# Patient Record
(demographics unavailable — no encounter records)

---

## 2024-12-02 NOTE — HISTORY OF PRESENT ILLNESS
[FreeTextEntry1] : spot on the chest and forehead [de-identified] : 64F with no personal hx of skin cancer here for  1) red spot on the chest 2) red spot on the forehead

## 2024-12-02 NOTE — PHYSICAL EXAM
[Alert] : alert [Oriented x 3] : ~L oriented x 3 [Well Nourished] : well nourished [Conjunctiva Non-injected] : conjunctiva non-injected [No Visual Lymphadenopathy] : no visual  lymphadenopathy [No Clubbing] : no clubbing [No Edema] : no edema [No Bromhidrosis] : no bromhidrosis [No Chromhidrosis] : no chromhidrosis [FreeTextEntry3] : The patient is well-appearing, in no acute distress, alert and oriented x 3. Mood and affect are normal. A complete cutaneous examination of the scalp, face, neck, chest, abdomen, back, bilateral arms, bilateral legs, buttocks, digits, nails, eyelids, conjunctiva and lips reveals the following significant findings: -R chest with red vascular papule -R forehead with yellow pink somewhat pearly papule

## 2024-12-19 NOTE — ASSESSMENT
[FreeTextEntry1] : 63 year old female with past medical history of Osteoporosis, Hyperthyroidism s/p LEONARDO (late 90s)on Levothyroxine, Sjorgen's syndrome, Autoimmune Hepatitis presents for follow up  1. Osteoporosis Patient has multiple risk factors of osteoporosis/osteopenia: postmenopausal status, hx of hyperthyroidism, recent anorexia/malnutrition, multiple inflammatory conditions.  Counseled on the need for Calcium either through nutrition or supplement of at least 5973-0053 mg daily Cont Vit D Counseled on importance on strength training exercise for bone health Cont Risedronate 150 mg Qweekly  2. Hypothyroidism TSH stable Cont Levothyroxine 88 mcg QD

## 2024-12-19 NOTE — ASSESSMENT
[FreeTextEntry1] : 63 year old female with past medical history of Osteoporosis, Hyperthyroidism s/p LEONARDO (late 90s)on Levothyroxine, Sjorgen's syndrome, Autoimmune Hepatitis presents for follow up  1. Osteoporosis Patient has multiple risk factors of osteoporosis/osteopenia: postmenopausal status, hx of hyperthyroidism, recent anorexia/malnutrition, multiple inflammatory conditions.  Counseled on the need for Calcium either through nutrition or supplement of at least 4737-9543 mg daily Cont Vit D Counseled on importance on strength training exercise for bone health Cont Risedronate 150 mg Qweekly  2. Hypothyroidism TSH stable Cont Levothyroxine 88 mcg QD

## 2024-12-19 NOTE — HISTORY OF PRESENT ILLNESS
[FreeTextEntry1] : Ms. BHAVESH OSPINA  is a 64 year old female with past medical history of Osteoporosis, Hyperthyroidism s/p LEONARDO (late 90s)on Levothyroxine, Sjorgen's syndrome, Autoimmune Hepatitis who presents for management of her osteoporosis.  Bone History: Menopause: Last menstrual period 11 years ago Osteoporosis diagnosed in 1/2020 Fracture history: No history of fractures Family history: No parental history of osteoporosis Treatment: Risedronate 150 mg Qmonthly (8/2024-  Falls: No Height loss: No Kidney stones: No Dental health: Regular appointments, Patient had implants few weeks ago Exercise: walking Dairy intake: Milk, yogurt, cheese Calcium supplements: calcium  Multivitamin: No Vitamin D supplements: Vitamin D3 2000 QD  Osteoporosis risk factors include: Postmenopausal status,  race, prior fracture, anorexia, vitamin D deficiency, corticosteroid use, malabsorption, hyperthyroidism.  Thyroid Hx: Patient has history of Graves Disease that was treated with Radioactive Iodine Ablation and has been on Levothyroxine since. Her dose was decreased from 100 mg because her TSH was suppressed. She is currently on Levothyroxine 100 mcg QD. It was recently increased by PCP.

## 2025-01-30 NOTE — ASSESSMENT
[FreeTextEntry1] : Profuse night sweats and fatigue since completing treatment for bacteremia/pyelonephritis  Hospitalized at [facility]

## 2025-02-04 NOTE — HISTORY OF PRESENT ILLNESS
[FreeTextEntry1] : History of present illness: Patient is a 64-year-old female with past medical history significant for Sjogren syndrome, autoimmune hepatitis and hyperthyroidism status post LEONARDO on levothyroxine who was seen in outpatient nephrology clinic today for discussion of labs and evaluation of renal function.  She reports being admitted to [facility] in December for pyelonephritis/sepsis.  At that time developed LORENZO with creatinine going as high as 3.  By the time of discharge creatinine was down to 1.3 still above her baseline of 0.7 mg/DL.  Reports continued to feel weak, malaise, night sweats. Taking iron for iron deficiency anemia. Denies dysuria, gross hematuria, fever, chills, diarrhea, nausea. No joint pains, rashes.

## 2025-02-04 NOTE — PHYSICAL EXAM
[TextEntry] : Gen: NAD, well-appearing HEENT: Supple neck, MMM Pulm: CTA CV: RRR, no rub Back: No spinal or CVA tenderness Abd: +BS, soft, nontender/nondistended LE: Warm, no edema Neuro: No focal deficits Psych: Normal affect Skin: Warm, without rashes

## 2025-02-04 NOTE — ASSESSMENT
[FreeTextEntry1] : 64 YOF Sjogren syndrome, autoimmune hepatitis and hyperthyroidism status post LEONARDO on levothyroxine who was seen in outpatient nephrology clinic today for discussion of labs and evaluation of renal function.  She reports being admitted to [facility] in December for pyelonephritis/sepsis.  At that time developed LORENZO with creatinine going as high as 3.  By the time of discharge creatinine was down to 1.3 still above her baseline of 0.7 mg/DL.  Acute kidney injury: -Serum creatinine 0.7 in July last year for a GFR of 71 -Labs obtained today with serum creatinine of 1.5 for a GFR of 39 -Will check urinalysis and urine albumin to creatinine ratio -Given LORENZO, anemia and history of autoimmune disease will rule out inflammation and check ESR, CRP, C3/C4, NIKA -if high inflammatory markers might need a/steroid course and consultation with rheumatology -Also discussed with the patient finding of imaging showing Bosniak 2 renal lesion.  She is scheduled for a follow-up MRI in July.  History of pyelonephritis: Patient will be following up with infectious disease today. Hypothyroidism: TSH stable, continue levothyroxine. Osteoporosis: Follows endocrinology

## 2025-05-19 NOTE — HISTORY OF PRESENT ILLNESS
[FreeTextEntry1] : f/u spot on the forehead [de-identified] : 64F here for f/u  At , had a bx on the R forehead completed that demonstrated the surface of a cystic basosquamous proliferation with atypia. Was recommended to return for repeat eval. Notes the area has healed and resolved

## 2025-05-19 NOTE — PHYSICAL EXAM
[Alert] : alert [Oriented x 3] : ~L oriented x 3 [Well Nourished] : well nourished [Conjunctiva Non-injected] : conjunctiva non-injected [No Visual Lymphadenopathy] : no visual  lymphadenopathy [No Clubbing] : no clubbing [No Edema] : no edema [No Bromhidrosis] : no bromhidrosis [No Chromhidrosis] : no chromhidrosis [FreeTextEntry3] : Well-healed scar on the R forehead